# Patient Record
Sex: MALE | Race: WHITE | NOT HISPANIC OR LATINO | Employment: OTHER | URBAN - METROPOLITAN AREA
[De-identification: names, ages, dates, MRNs, and addresses within clinical notes are randomized per-mention and may not be internally consistent; named-entity substitution may affect disease eponyms.]

---

## 2018-11-08 ENCOUNTER — OFFICE VISIT CONVERTED (OUTPATIENT)
Dept: UROLOGY | Facility: CLINIC | Age: 38
End: 2018-11-08
Attending: NURSE PRACTITIONER

## 2020-02-12 ENCOUNTER — HOSPITAL ENCOUNTER (OUTPATIENT)
Dept: GENERAL RADIOLOGY | Facility: HOSPITAL | Age: 40
Discharge: HOME | End: 2020-02-12

## 2021-05-16 VITALS
TEMPERATURE: 97.6 F | SYSTOLIC BLOOD PRESSURE: 129 MMHG | HEART RATE: 69 BPM | HEIGHT: 70 IN | WEIGHT: 199 LBS | BODY MASS INDEX: 28.49 KG/M2 | DIASTOLIC BLOOD PRESSURE: 72 MMHG

## 2021-11-18 ENCOUNTER — HOSPITAL ENCOUNTER (EMERGENCY)
Facility: HOSPITAL | Age: 41
Discharge: HOME | End: 2021-11-18
Attending: EMERGENCY MEDICINE | Admitting: EMERGENCY MEDICINE

## 2021-11-18 VITALS
DIASTOLIC BLOOD PRESSURE: 64 MMHG | OXYGEN SATURATION: 100 % | TEMPERATURE: 98.7 F | HEART RATE: 89 BPM | BODY MASS INDEX: 32.92 KG/M2 | WEIGHT: 229.94 LBS | SYSTOLIC BLOOD PRESSURE: 135 MMHG | RESPIRATION RATE: 18 BRPM | HEIGHT: 70 IN

## 2021-11-18 DIAGNOSIS — R60.0 PEDAL EDEMA: ICD-10-CM

## 2021-11-18 DIAGNOSIS — T78.40XA ALLERGIC REACTION, INITIAL ENCOUNTER: Primary | ICD-10-CM

## 2021-11-18 PROCEDURE — 99282 EMERGENCY DEPT VISIT SF MDM: CPT

## 2021-11-18 RX ORDER — MONTELUKAST SODIUM 10 MG/1
10 TABLET ORAL NIGHTLY
COMMUNITY

## 2021-11-18 RX ORDER — TESTOSTERONE CYPIONATE 200 MG/ML
1 INJECTION, SOLUTION INTRAMUSCULAR WEEKLY
COMMUNITY

## 2021-11-18 RX ORDER — OXYCODONE AND ACETAMINOPHEN 10; 325 MG/1; MG/1
10-325 TABLET ORAL 4 TIMES DAILY
COMMUNITY

## 2021-11-18 RX ORDER — FUROSEMIDE 20 MG/1
20 TABLET ORAL DAILY
Qty: 5 TABLET | Refills: 0 | Status: SHIPPED | OUTPATIENT
Start: 2021-11-18

## 2021-11-18 NOTE — ED NOTES
PT was diagnosed with cellulitis two weeks ago, has been taking oral antibiotics. Pt stated foot cellulitis has improved. Pt received a rocephin shot a week ago and broke out with a body rash. PT is complaining of full body rash. Noted there is a body rash.      Dao Haile, RN  11/18/21 0044

## 2021-11-18 NOTE — ED PROVIDER NOTES
"Time: 12:37 AM EST  Arrived by: Private car  Chief Complaint: Rash    History of Present Illness:    Lee Acosta is a 41 y.o. male who presents to the emergency department today with complaints of moderate rash. The patient reports that he was diagnosed with cellulitis to his right foot approximately two weeks ago. He adds that his skin \"feels like plastic\" to this region. He states that he has noticed slight improvement with oral antibiotics as well as rocephin shot one week ago. He notes that that he also received an additional rocephin injection two days ago. Around this time, he reports that he developed additional itchy generalized rash which has continued to spread and is present across his entire body.    He presently denies any other symptoms including difficulty breathing.    The patient is a former smoker but denies alcohol or drug use. There are no other acute complaints at this time.      History provided by:  Patient   used: No    Rash  Location:  Full body  Quality: itchiness    Severity:  Moderate  Onset quality:  Gradual  Duration:  2 days (With cellulitis for two weeks)  Timing:  Constant  Progression since onset: Improved cellulitis, but worsened itching.  Chronicity:  New  Context comment:  Patient was diagnosed with cellulitis two weeks ago.  Relieved by: Cellulitis improvement with antibiotics.  Worsened by:  Nothing  Associated symptoms: no diarrhea, no fever, no shortness of breath and not vomiting        Similar Symptoms Previously: Yes.  Recently seen: Patient has no recent visits on file; however, he states that he was seen two days ago for a rocephin injection.      Patient Care Team  Primary Care Provider: Tasha Aquino APRN    Past Medical History:     Allergies   Allergen Reactions   • Shellfish-Derived Products Anaphylaxis     History reviewed. No pertinent past medical history.  Past Surgical History:   Procedure Laterality Date   • PAIN PUMP INSERTION/REVISION   " "  • SINUS SURGERY     • SPINAL FUSION      L1, 4, 5 and S1     History reviewed. No pertinent family history.    Home Medications:  Prior to Admission medications    Not on File        Social History:   Social History     Tobacco Use   • Smoking status: Former Smoker   • Smokeless tobacco: Not on file   • Tobacco comment: 12 years ago   Substance Use Topics   • Alcohol use: Never   • Drug use: Never       Record Review:  I have reviewed the patient's records in [facility].     Review of Systems:  Review of Systems   Constitutional: Negative for chills and fever.   HENT: Negative for nosebleeds.    Eyes: Negative for redness.   Respiratory: Negative for cough and shortness of breath.    Cardiovascular: Negative for chest pain.   Gastrointestinal: Negative for diarrhea and vomiting.   Genitourinary: Negative for dysuria and frequency.   Musculoskeletal: Negative for back pain and neck pain.        Improving cellulitis/swelling to the feet.   Skin: Positive for rash (generalized with itching).   Neurological: Negative for seizures.   All other systems reviewed and are negative.       Physical Exam:  /64 (BP Location: Right arm, Patient Position: Sitting)   Pulse 89   Temp 98.7 °F (37.1 °C) (Oral)   Resp 18   Ht 177.8 cm (70\")   Wt 104 kg (229 lb 15 oz)   SpO2 100%   BMI 32.99 kg/m²     Physical Exam  Vitals and nursing note reviewed.   Constitutional:       General: He is not in acute distress.  HENT:      Head: Normocephalic and atraumatic.      Nose: Nose normal.      Mouth/Throat:      Mouth: Mucous membranes are moist.   Eyes:      General: No scleral icterus.  Cardiovascular:      Rate and Rhythm: Normal rate and regular rhythm.      Heart sounds: Normal heart sounds. No murmur heard.      Pulmonary:      Effort: No respiratory distress.      Breath sounds: Normal breath sounds.   Abdominal:      Palpations: Abdomen is soft.      Tenderness: There is no abdominal tenderness.   Musculoskeletal:         " General: No tenderness. Normal range of motion.      Cervical back: Normal range of motion and neck supple.      Right lower leg: Pitting Edema present.      Left lower leg: Pitting Edema present.      Comments: Moderate bilateral lower extremity pitting edema.   Skin:     General: Skin is warm and dry.      Findings: Rash (Diffuse urticarial rash over his whole body) present.   Neurological:      Mental Status: He is alert. Mental status is at baseline.   Psychiatric:         Behavior: Behavior normal.                Medications in the Emergency Department:  Medications - No data to display     Labs  Lab Results (last 24 hours)     ** No results found for the last 24 hours. **           Imaging:  No Radiology Exams Resulted Within Past 24 Hours    Procedures:  Procedures    Progress                            Medical Decision Making:  MDM     Patient with acute allergic reaction.  Patient declined steroids and recommended antihistamines and close monitoring at home the patient has no evidence of airway or respiratory involvement.  Patient is not hypotensive.  Patient does have lower extremity pitting pedal edema I ordered labs of the patient's declined to have any lab work done states he will follow up with his PCP.  Discussed the importance of elevation of his legs and prescribed a short course of diuretic.  We discussed return precautions including worsening symptoms or any additional concerns.    Final diagnoses:   Allergic reaction, initial encounter   Pedal edema        Disposition:  ED Disposition     ED Disposition Condition Comment    Discharge Stable             This medical record created using voice recognition software and may contain unintended errors.    Documentation assistance provided by Viola Ludwig acting as scribe for Jourdan Loyola MD. Information recorded by the scribe was done at my direction and has been verified and validated by me.      Viola Ludwig  11/18/21 0103       Jourdan Loyola,  MD  11/18/21 0658

## 2024-01-29 ENCOUNTER — LAB (OUTPATIENT)
Dept: LAB | Facility: HOSPITAL | Age: 44
End: 2024-01-29
Payer: MEDICARE

## 2024-01-29 ENCOUNTER — TRANSCRIBE ORDERS (OUTPATIENT)
Dept: LAB | Facility: HOSPITAL | Age: 44
End: 2024-01-29
Payer: MEDICARE

## 2024-01-29 DIAGNOSIS — Z76.89 PERSONS ENCOUNTERING HEALTH SERVICES IN OTHER SPECIFIED CIRCUMSTANCES: ICD-10-CM

## 2024-01-29 DIAGNOSIS — E78.5 HYPERLIPIDEMIA, UNSPECIFIED HYPERLIPIDEMIA TYPE: ICD-10-CM

## 2024-01-29 DIAGNOSIS — Z76.89 PERSONS ENCOUNTERING HEALTH SERVICES IN OTHER SPECIFIED CIRCUMSTANCES: Primary | ICD-10-CM

## 2024-01-29 LAB
ALBUMIN SERPL-MCNC: 4.5 G/DL (ref 3.5–5.2)
ALBUMIN/GLOB SERPL: 1.8 G/DL
ALP SERPL-CCNC: 67 U/L (ref 39–117)
ALT SERPL W P-5'-P-CCNC: 39 U/L (ref 1–41)
ANION GAP SERPL CALCULATED.3IONS-SCNC: 9 MMOL/L (ref 5–15)
AST SERPL-CCNC: 26 U/L (ref 1–40)
BASOPHILS # BLD AUTO: 0.03 10*3/MM3 (ref 0–0.2)
BASOPHILS NFR BLD AUTO: 0.5 % (ref 0–1.5)
BILIRUB SERPL-MCNC: 0.5 MG/DL (ref 0–1.2)
BUN SERPL-MCNC: 17 MG/DL (ref 6–20)
BUN/CREAT SERPL: 19.8 (ref 7–25)
CALCIUM SPEC-SCNC: 9.8 MG/DL (ref 8.6–10.5)
CHLORIDE SERPL-SCNC: 103 MMOL/L (ref 98–107)
CHOLEST SERPL-MCNC: 137 MG/DL (ref 0–200)
CO2 SERPL-SCNC: 27 MMOL/L (ref 22–29)
CREAT SERPL-MCNC: 0.86 MG/DL (ref 0.76–1.27)
DEPRECATED RDW RBC AUTO: 39.9 FL (ref 37–54)
EGFRCR SERPLBLD CKD-EPI 2021: 108.8 ML/MIN/1.73
EOSINOPHIL # BLD AUTO: 0.02 10*3/MM3 (ref 0–0.4)
EOSINOPHIL NFR BLD AUTO: 0.3 % (ref 0.3–6.2)
ERYTHROCYTE [DISTWIDTH] IN BLOOD BY AUTOMATED COUNT: 12.2 % (ref 12.3–15.4)
GLOBULIN UR ELPH-MCNC: 2.5 GM/DL
GLUCOSE SERPL-MCNC: 106 MG/DL (ref 65–99)
HCT VFR BLD AUTO: 43.8 % (ref 37.5–51)
HDLC SERPL-MCNC: 40 MG/DL (ref 40–60)
HGB BLD-MCNC: 14.5 G/DL (ref 13–17.7)
IMM GRANULOCYTES # BLD AUTO: 0.01 10*3/MM3 (ref 0–0.05)
IMM GRANULOCYTES NFR BLD AUTO: 0.2 % (ref 0–0.5)
LDLC SERPL CALC-MCNC: 77 MG/DL (ref 0–100)
LDLC/HDLC SERPL: 1.89 {RATIO}
LYMPHOCYTES # BLD AUTO: 0.92 10*3/MM3 (ref 0.7–3.1)
LYMPHOCYTES NFR BLD AUTO: 15.9 % (ref 19.6–45.3)
MCH RBC QN AUTO: 29.5 PG (ref 26.6–33)
MCHC RBC AUTO-ENTMCNC: 33.1 G/DL (ref 31.5–35.7)
MCV RBC AUTO: 89 FL (ref 79–97)
MONOCYTES # BLD AUTO: 0.48 10*3/MM3 (ref 0.1–0.9)
MONOCYTES NFR BLD AUTO: 8.3 % (ref 5–12)
NEUTROPHILS NFR BLD AUTO: 4.32 10*3/MM3 (ref 1.7–7)
NEUTROPHILS NFR BLD AUTO: 74.8 % (ref 42.7–76)
NRBC BLD AUTO-RTO: 0 /100 WBC (ref 0–0.2)
PLATELET # BLD AUTO: 168 10*3/MM3 (ref 140–450)
PMV BLD AUTO: 12.2 FL (ref 6–12)
POTASSIUM SERPL-SCNC: 4.2 MMOL/L (ref 3.5–5.2)
PROT SERPL-MCNC: 7 G/DL (ref 6–8.5)
RBC # BLD AUTO: 4.92 10*6/MM3 (ref 4.14–5.8)
SODIUM SERPL-SCNC: 139 MMOL/L (ref 136–145)
T4 FREE SERPL-MCNC: 1.62 NG/DL (ref 0.93–1.7)
TRIGL SERPL-MCNC: 107 MG/DL (ref 0–150)
TSH SERPL DL<=0.05 MIU/L-ACNC: 0.61 UIU/ML (ref 0.27–4.2)
VLDLC SERPL-MCNC: 20 MG/DL (ref 5–40)
WBC NRBC COR # BLD AUTO: 5.78 10*3/MM3 (ref 3.4–10.8)

## 2024-01-29 PROCEDURE — 80053 COMPREHEN METABOLIC PANEL: CPT

## 2024-01-29 PROCEDURE — 36415 COLL VENOUS BLD VENIPUNCTURE: CPT

## 2024-01-29 PROCEDURE — 80061 LIPID PANEL: CPT

## 2024-01-29 PROCEDURE — 84439 ASSAY OF FREE THYROXINE: CPT

## 2024-01-29 PROCEDURE — 84443 ASSAY THYROID STIM HORMONE: CPT

## 2024-01-29 PROCEDURE — 85025 COMPLETE CBC W/AUTO DIFF WBC: CPT

## 2024-01-30 ENCOUNTER — TRANSCRIBE ORDERS (OUTPATIENT)
Dept: GENERAL RADIOLOGY | Facility: HOSPITAL | Age: 44
End: 2024-01-30
Payer: MEDICARE

## 2024-01-30 ENCOUNTER — HOSPITAL ENCOUNTER (OUTPATIENT)
Dept: GENERAL RADIOLOGY | Facility: HOSPITAL | Age: 44
Discharge: HOME | End: 2024-01-30
Payer: MEDICARE

## 2024-01-30 DIAGNOSIS — M96.1 POSTLAMINECTOMY SYNDROME, NOT ELSEWHERE CLASSIFIED: ICD-10-CM

## 2024-01-30 DIAGNOSIS — M96.1 POSTLAMINECTOMY SYNDROME, NOT ELSEWHERE CLASSIFIED: Primary | ICD-10-CM

## 2024-01-30 PROCEDURE — 72110 X-RAY EXAM L-2 SPINE 4/>VWS: CPT

## 2024-01-30 PROCEDURE — 72070 X-RAY EXAM THORAC SPINE 2VWS: CPT

## 2024-02-01 ENCOUNTER — TRANSCRIBE ORDERS (OUTPATIENT)
Dept: ADMINISTRATIVE | Facility: HOSPITAL | Age: 44
End: 2024-02-01
Payer: MEDICARE

## 2024-02-01 DIAGNOSIS — Z97.8 PRESENCE OF OTHER SPECIFIED DEVICES: Primary | ICD-10-CM

## 2024-05-09 ENCOUNTER — TRANSCRIBE ORDERS (OUTPATIENT)
Dept: ADMINISTRATIVE | Facility: HOSPITAL | Age: 44
End: 2024-05-09
Payer: MEDICARE

## 2024-05-09 ENCOUNTER — HOSPITAL ENCOUNTER (OUTPATIENT)
Dept: CARDIOLOGY | Facility: HOSPITAL | Age: 44
Discharge: HOME | End: 2024-05-09
Payer: MEDICARE

## 2024-05-09 DIAGNOSIS — R07.9 CHEST PAIN, UNSPECIFIED TYPE: ICD-10-CM

## 2024-05-09 DIAGNOSIS — R07.9 CHEST PAIN, UNSPECIFIED TYPE: Primary | ICD-10-CM

## 2024-05-09 PROCEDURE — 93005 ELECTROCARDIOGRAM TRACING: CPT | Performed by: NURSE PRACTITIONER

## 2024-05-13 LAB
QT INTERVAL: 335 MS
QTC INTERVAL: 406 MS